# Patient Record
Sex: FEMALE | Race: WHITE | NOT HISPANIC OR LATINO
[De-identification: names, ages, dates, MRNs, and addresses within clinical notes are randomized per-mention and may not be internally consistent; named-entity substitution may affect disease eponyms.]

---

## 2020-12-18 PROBLEM — Z00.00 ENCOUNTER FOR PREVENTIVE HEALTH EXAMINATION: Status: ACTIVE | Noted: 2020-12-18

## 2021-01-06 ENCOUNTER — APPOINTMENT (OUTPATIENT)
Dept: NEUROLOGY | Facility: CLINIC | Age: 51
End: 2021-01-06
Payer: COMMERCIAL

## 2021-01-06 VITALS
WEIGHT: 135 LBS | TEMPERATURE: 97.2 F | DIASTOLIC BLOOD PRESSURE: 86 MMHG | HEIGHT: 64 IN | BODY MASS INDEX: 23.05 KG/M2 | HEART RATE: 99 BPM | SYSTOLIC BLOOD PRESSURE: 127 MMHG

## 2021-01-06 DIAGNOSIS — G11.9 HEREDITARY ATAXIA, UNSPECIFIED: ICD-10-CM

## 2021-01-06 DIAGNOSIS — R27.0 ATAXIA, UNSPECIFIED: ICD-10-CM

## 2021-01-06 DIAGNOSIS — R26.81 UNSTEADINESS ON FEET: ICD-10-CM

## 2021-01-06 DIAGNOSIS — E51.9 THIAMINE DEFICIENCY, UNSPECIFIED: ICD-10-CM

## 2021-01-06 DIAGNOSIS — G62.9 POLYNEUROPATHY, UNSPECIFIED: ICD-10-CM

## 2021-01-06 PROCEDURE — 99072 ADDL SUPL MATRL&STAF TM PHE: CPT

## 2021-01-06 PROCEDURE — 99204 OFFICE O/P NEW MOD 45 MIN: CPT

## 2021-01-07 RX ORDER — THIAMINE HCL 100 MG
100 TABLET ORAL
Refills: 0 | Status: ACTIVE | COMMUNITY

## 2021-01-08 NOTE — HISTORY OF PRESENT ILLNESS
[FreeTextEntry1] : Selina Colbert is a 50 year old woman with a history of hypertension, and heavy alcohol use presenting for a consultation for numbness of the feet and hands and worsening gait and balance.\par \par She has had numbness in her hands since 5/2018 and a slow, gradual numbness in her feet for over one year. She had one fall two years ago where she fell backwards down the stairs. \par \par The day prior to Thanksgiving she had hand numbness with leg stiffness and unsteady gait. She went to urgent care and then [facility] and had a CT done.  She also had elevated blood pressure while in the ED. Her gait has slightly improved over 6 weeks. Ms. Colbert also has a history of heavy alcohol use and poor nutrition. She quit 2.5 weeks ago. Ms. Colbert endorses that she found to have a thiamine deficiency and received IV thiamine while in the hospital and is still taking thiamine supplements. She also had shaking of her hands after stopping alcohol and was placed in the observation unit at the hospital. She was diagnosed with possible alcohol withdrawal. Ms. Colbert denies bowel or bladder difficulties. \par \par The remaining neurological review of systems is negative.

## 2021-01-08 NOTE — CONSULT LETTER
[Dear  ___] : Dear  [unfilled], [FreeTextEntry1] : I had the pleasure of evaluating your patient, LAWRENCE BAH. Please see the assessment section below for a summary of my diagnostic impression and plan.\par \par Thank you very much for allowing me to participate in the care of this patient. If you have any questions, please do not hesitate to contact me. \par \par Sincerely,\par \par Jodi Justin MD\par

## 2021-01-08 NOTE — PHYSICAL EXAM
[FreeTextEntry1] : Physical examination \par General: No acute distress, Awake, Alert.   \par Other: axial ataxia. \par \par Mental status \par Awake, alert, and oriented to person, time and place, Normal attention span and concentration, Recent and remote memory intact, Language intact, Fund of knowledge intact.   \par 4Q1$, 7Q$1.75\par Delayed recall 3/3. \par \par Cranial Nerves \par II: VFF  \par III, IV, VI: PERRL, EOMI.   \par V: Facial sensation is normal B/L.   \par VII: Facial strength is normal B/L. \par \par \par VIII: Gross hearing is intact.   \par \par \par IX, X: Palate is midline and elevates symmetrically.   \par XI: Trapezius normal strength.   \par XII: Tongue midline without atrophy or fasciculations. \par \par Motor exam  \par Muscle tone - mild cogwheel in arms. spasticity in legs with foot inversion. \par No atrophy or fasciculations \par Muscle Strength: arms and legs, proximal and distal flexors and extensors are normal \par \par No UE drift.\par \par Hand tremors B. high frequency, low amplitude with action and posture irregular. \par Reflexes \par BR with FF B.\par  Bicep, tricep 1.\par Pectoralis major present. \par knees 1\par Ankles 0\par \par Plantars right: downgoing.\par Plantars left: downgoing. \par \par \par Coordination \par Slow, ataxia - FNF, GENIE, HKS. Left fist slower with open and closing and clumsiness of hands.\par \par Sensory \par Intact sensation to PP and cold.\par Proprioception decreased slightly to lower extremities.\par Reduced distal sensation to vibration B. \par \par Positive Romberg. \par \par Gait \par steppage gait ?spastic gait\par \par

## 2021-01-15 ENCOUNTER — LABORATORY RESULT (OUTPATIENT)
Age: 51
End: 2021-01-15

## 2021-01-19 LAB
ACE BLD-CCNC: 37 U/L
ALBUMIN MFR SERPL ELPH: 58.6 %
ALBUMIN SERPL-MCNC: 3.8 G/DL
ALBUMIN/GLOB SERPL: 1.4 RATIO
ALPHA1 GLOB MFR SERPL ELPH: 5.5 %
ALPHA1 GLOB SERPL ELPH-MCNC: 0.4 G/DL
ALPHA2 GLOB MFR SERPL ELPH: 12.3 %
ALPHA2 GLOB SERPL ELPH-MCNC: 0.8 G/DL
B BURGDOR IGG+IGM SER QL IB: NORMAL
B-GLOBULIN MFR SERPL ELPH: 10.1 %
B-GLOBULIN SERPL ELPH-MCNC: 0.7 G/DL
CRP SERPL-MCNC: 0.45 MG/DL
DSDNA AB SER-ACNC: <12 IU/ML
ENA RNP AB SER IA-ACNC: 0.2 AL
ENA SM AB SER IA-ACNC: <0.2 AL
ENA SS-A AB SER IA-ACNC: <0.2 AL
ENA SS-B AB SER IA-ACNC: <0.2 AL
ERYTHROCYTE [SEDIMENTATION RATE] IN BLOOD BY WESTERGREN METHOD: 18 MM/HR
ESTIMATED AVERAGE GLUCOSE: 134 MG/DL
GAMMA GLOB FLD ELPH-MCNC: 0.9 G/DL
GAMMA GLOB MFR SERPL ELPH: 13.5 %
GLIADIN IGA SER QL: <5 UNITS
GLIADIN IGG SER QL: <5 UNITS
GLIADIN PEPTIDE IGA SER-ACNC: NEGATIVE
GLIADIN PEPTIDE IGG SER-ACNC: NEGATIVE
HBA1C MFR BLD HPLC: 6.3 %
HTLV I+II AB SER QL: NORMAL
INTERPRETATION SERPL IEP-IMP: NORMAL
M PROTEIN SPEC IFE-MCNC: NORMAL
MPO AB + PR3 PNL SER: NORMAL
PROT SERPL-MCNC: 6.5 G/DL
PROT SERPL-MCNC: 6.5 G/DL
RHEUMATOID FACT SER QL: 11 IU/ML
TSH SERPL-ACNC: 4.47 UIU/ML

## 2021-01-20 ENCOUNTER — RESULT REVIEW (OUTPATIENT)
Age: 51
End: 2021-01-20

## 2021-01-20 LAB
ANA SER IF-ACNC: NEGATIVE
COPPER SERPL-MCNC: 107 UG/DL
VIT B12 SERPL-MCNC: 1690 PG/ML

## 2021-01-21 LAB
TTG IGA SER IA-ACNC: <1.2 U/ML
TTG IGA SER-ACNC: NEGATIVE
TTG IGG SER IA-ACNC: 1.7 U/ML
TTG IGG SER IA-ACNC: NEGATIVE
ZINC SERPL-MCNC: 71 UG/DL

## 2021-01-22 ENCOUNTER — NON-APPOINTMENT (OUTPATIENT)
Age: 51
End: 2021-01-22

## 2021-01-25 LAB — VIT B1 SERPL-MCNC: 253.7 NMOL/L

## 2021-02-03 ENCOUNTER — APPOINTMENT (OUTPATIENT)
Dept: NEUROLOGY | Facility: CLINIC | Age: 51
End: 2021-02-03
Payer: COMMERCIAL

## 2021-02-03 PROCEDURE — 95886 MUSC TEST DONE W/N TEST COMP: CPT

## 2021-02-03 PROCEDURE — 95913 NRV CNDJ TEST 13/> STUDIES: CPT

## 2021-02-03 PROCEDURE — 99072 ADDL SUPL MATRL&STAF TM PHE: CPT

## 2021-02-08 ENCOUNTER — APPOINTMENT (OUTPATIENT)
Dept: NEUROLOGY | Facility: CLINIC | Age: 51
End: 2021-02-08
Payer: COMMERCIAL

## 2021-02-08 VITALS
TEMPERATURE: 97.2 F | WEIGHT: 135 LBS | HEIGHT: 64 IN | SYSTOLIC BLOOD PRESSURE: 129 MMHG | BODY MASS INDEX: 23.05 KG/M2 | DIASTOLIC BLOOD PRESSURE: 87 MMHG | HEART RATE: 104 BPM

## 2021-02-08 PROCEDURE — 99072 ADDL SUPL MATRL&STAF TM PHE: CPT

## 2021-02-08 PROCEDURE — 99215 OFFICE O/P EST HI 40 MIN: CPT

## 2021-02-09 NOTE — PHYSICAL EXAM
[FreeTextEntry1] : Physical examination \par General: No acute distress, Awake, Alert.   \par Other: axial ataxia. (improved from previous visit)\par \par Mental status \par Awake, alert, and oriented to person, time and place, Normal attention span and concentration, Recent and remote memory intact, Language intact, Fund of knowledge intact.   \par 4Q1$, 7Q$1.75\par Delayed recall 3/3. \par \par Cranial Nerves \par II: VFF  \par III, IV, VI: PERRL, EOMI.   \par V: Facial sensation is normal B/L.   \par VII: Facial strength is normal B/L. \par \par VIII: Gross hearing is intact.   \par \par IX, X: Palate is midline and elevates symmetrically.   \par XI: Trapezius normal strength.   \par XII: Tongue midline without atrophy or fasciculations. \par \par Motor exam  \par Muscle tone - mild cogwheel in arms. minimal or no spasticity in legs with no foot inversion. \par No atrophy or fasciculations \par Muscle Strength: arms and legs, proximal and distal flexors and extensors are normal \par \par No UE drift.\par \par Minimal hand tremors B. high frequency, low amplitude with action and posture irregular. \par \par Reflexes \par Ankles 0\par Arms 2\par legs 0\par ankle 0\par Plantars right: downgoing.\par Plantars left: downgoing. \par \par \par Coordination \par Slow, ataxia - FNF, GENIE, HKS. Left fist slower with open and closing and clumsiness of hands.\par \par Sensory \par Intact sensation to cold. \par Increased PP above ankle B.\par Decreased vibration Left MM.\par No vibration at right MM. \par Positive Romberg. \par \par Gait \par wide based, tentative.  no steppage gait, no spastic gait (improved from previous visit) \par \par

## 2021-02-09 NOTE — DATA REVIEWED
[de-identified] : 1/20/21 No acute intracranial hemorrhage, acute infarction, extra-axial fluid collection or hydrocephalus. 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  [de-identified] : 2/3/21 EMG - There is electrophysiologic evidence of sensory polyneuropathy. \par 1/20/21 MRI cervical spine- Mild cervical spondylitic changes as detailed above most notable for mild left foraminal stenosis at C5-C6 and mild right foraminal stenosis at C6-C7. \par 12/2020 CT head-see report

## 2021-02-09 NOTE — ASSESSMENT
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 Serological workup for other identifiable causes previously reviewed.  Second opinion with Dr. Erik Gonzalez - consider any other diagnostic etiologies including autoimmune disease - do you recommend any other work up or treatment.  \par \par MRI brain consistent with a diagnosis of thiamine deficiency.\par \par No evidence of a compressive myelopathy on MRI cervical spine.\par EMG and MRI results reviewed with patient and family. \par Continue thiamine\par \par PT referral. \par \par Follow up in 1 month.\par \par I discussed in detail with the patient and family the diagnosis, prognosis, treatment plan and answered all of their questions.\par \par

## 2021-02-09 NOTE — HISTORY OF PRESENT ILLNESS
[FreeTextEntry1] : Selina Colbert is a 50 year old woman with a history of hypertension, and heavy alcohol use presenting for a follow up appointment.\par \par Her initial presentation was in November 2020, she had a rapid decline in her walking with gait imbalance and since then has had a slow improvement but still with significant impairment.  She endorses her gait has improved. She cannot stand for a long time. There is some fluctuation in her walking.\par \par She describes her hands as feeling like sandpaper and numbness in her feet.\par \par The remaining neurological review of systems is negative. \par  \par 1/6/21\par Selina Colbert is a 50 year old woman with a history of hypertension, and heavy alcohol use presenting for a consultation for numbness of the feet and hands and worsening gait and balance.\par \par She has had numbness in her hands since 5/2018 and a slow, gradual numbness in her feet for over one year. She had one fall two years ago where she fell backwards down the stairs. \par \par The day prior to Thanksgiving she had hand numbness with leg stiffness and unsteady gait. She went to urgent care and then [facility] and had a CT done.  She also had elevated blood pressure while in the ED. Her gait has slightly improved over 6 weeks. Ms. Colbert also has a history of heavy alcohol use and poor nutrition. She quit 2.5 weeks ago. Ms. Colbert endorses that she found to have a thiamine deficiency and received IV thiamine while in the hospital and is still taking thiamine supplements. She also had shaking of her hands after stopping alcohol and was placed in the observation unit at the hospital. She was diagnosed with possible alcohol withdrawal. Ms. Colbert denies bowel or bladder difficulties. \par \par The remaining neurological review of systems is negative.

## 2021-03-11 ENCOUNTER — APPOINTMENT (OUTPATIENT)
Dept: NEUROLOGY | Facility: CLINIC | Age: 51
End: 2021-03-11
Payer: COMMERCIAL

## 2021-03-11 VITALS
BODY MASS INDEX: 23.56 KG/M2 | HEIGHT: 64 IN | WEIGHT: 138 LBS | HEART RATE: 94 BPM | SYSTOLIC BLOOD PRESSURE: 124 MMHG | DIASTOLIC BLOOD PRESSURE: 86 MMHG

## 2021-03-11 VITALS — TEMPERATURE: 97.3 F

## 2021-03-11 LAB
FOLATE SERPL-MCNC: >20 NG/ML
HCYS SERPL-MCNC: 8.4 UMOL/L
VIT B12 SERPL-MCNC: 954 PG/ML

## 2021-03-11 PROCEDURE — 99072 ADDL SUPL MATRL&STAF TM PHE: CPT

## 2021-03-11 PROCEDURE — 99214 OFFICE O/P EST MOD 30 MIN: CPT

## 2021-03-11 RX ORDER — AMLODIPINE BESYLATE 5 MG/1
5 TABLET ORAL
Refills: 0 | Status: DISCONTINUED | COMMUNITY
End: 2021-03-11

## 2021-03-11 NOTE — HISTORY OF PRESENT ILLNESS
[FreeTextEntry1] : Patient referred for evaluation of neuropathy.  She states that about a year ago she first noted tingling soles of feet and she was walking normally.  She also noted frequent waking at night with tingling arms.  She was working as a pharmacy tech.  Over the next several months she had some difficulty with balance.  \par \par This past November she noted the hands were tingling more and the feet had same tingling but she had more trouble walking - [facility].  Evaluated in ED and was given fluids.  She was diagnosed with thiamine deficiency in November.  By Jan she needed a walker due to weakness in the legs.  \par \par Today her legs and arms feel heavy, the tingling persists and now has some burning on soles and tops of toes.  The hands continue to have tingling but no burning and she has weak  and feels heaviness of the arms.  She has constant daily HA.  \par \par She endorses daily drinking, vodka, 3-5 drinks daily for 30 years.  Her last drink was December.  Memory is ok, no change in urinary habits. \par \par She denies diplopia, ptosis, dysphagia or SOB.  EMG by Dr. Justin shows sensory polyneuropathy.  MRI C spine no compression of cord\par \par She denies significant FH of genetic neuropathy.

## 2021-03-11 NOTE — ASSESSMENT
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  \par \par Will check Vit B12, MMA, homocysteine, folate and will call with results.  \par \par I have advised her that if she continues to abstain from ETOH use she will improve over time.  I have also encouraged her to go to 12 step meetings to help her stay sober.  \par \par

## 2021-03-11 NOTE — PHYSICAL EXAM
[Person] : oriented to person [Place] : oriented to place [Time] : oriented to time [Concentration Intact] : normal concentrating ability [Visual Intact] : visual attention was ~T not ~L decreased [Naming Objects] : no difficulty naming common objects [Repeating Phrases] : no difficulty repeating a phrase [Writing A Sentence] : no difficulty writing a sentence [Fluency] : fluency intact [Comprehension] : comprehension intact [Reading] : reading intact [Past History] : adequate knowledge of personal past history [Cranial Nerves Optic (II)] : visual acuity intact bilaterally,  visual fields full to confrontation, pupils equal round and reactive to light [Cranial Nerves Oculomotor (III)] : extraocular motion intact [Cranial Nerves Trigeminal (V)] : facial sensation intact symmetrically [Cranial Nerves Facial (VII)] : face symmetrical [Cranial Nerves Vestibulocochlear (VIII)] : hearing was intact bilaterally [Cranial Nerves Glossopharyngeal (IX)] : tongue and palate midline [Cranial Nerves Accessory (XI - Cranial And Spinal)] : head turning and shoulder shrug symmetric [Cranial Nerves Hypoglossal (XII)] : there was no tongue deviation with protrusion [Motor Tone] : muscle tone was normal in all four extremities [No Muscle Atrophy] : normal bulk in all four extremities [Motor Handedness Right-Handed] : the patient is right hand dominant [Hand Weakness Right] : normal hand  [Hand Weakness Left] : normal hand  [+4] : knee flexion +4/5 [5] : ankle plantar flexion 5/5 [Sensation Tactile Decrease] : light touch was intact [Vibration Decrease Leg / Foot Toes Both Feet] : decreased at the toes of both feet  [Position Sensation Decrease Leg/Foot At Level Of Toes] : impaired at the toes in both legs [Past-pointing] : there was no past-pointing [Tremor] : no tremor present [0] : Patella left 0 [1+] : Ankle jerk left 1+ [Plantar Reflex Right Only] : abnormal on the right [Plantar Reflex Left Only] : normal on the left [___] : absent on the right [___] : absent on the left [FreeTextEntry8] : wide based ataxic gait

## 2021-03-17 LAB — METHYLMALONATE SERPL-SCNC: 114 NMOL/L

## 2021-03-19 ENCOUNTER — NON-APPOINTMENT (OUTPATIENT)
Age: 51
End: 2021-03-19

## 2021-04-02 ENCOUNTER — APPOINTMENT (OUTPATIENT)
Dept: NEUROLOGY | Facility: CLINIC | Age: 51
End: 2021-04-02
Payer: COMMERCIAL

## 2021-04-02 VITALS
HEART RATE: 81 BPM | TEMPERATURE: 97.4 F | BODY MASS INDEX: 23.56 KG/M2 | SYSTOLIC BLOOD PRESSURE: 113 MMHG | HEIGHT: 64 IN | DIASTOLIC BLOOD PRESSURE: 78 MMHG | WEIGHT: 138 LBS

## 2021-04-02 DIAGNOSIS — G62.1 ALCOHOLIC POLYNEUROPATHY: ICD-10-CM

## 2021-04-02 PROCEDURE — 99214 OFFICE O/P EST MOD 30 MIN: CPT

## 2021-04-02 PROCEDURE — 99072 ADDL SUPL MATRL&STAF TM PHE: CPT

## 2021-04-06 NOTE — ASSESSMENT
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 She continues to improve. \par \par Second opinion with Dr. Erik Gonzalez - appreciated. \par \par MRI brain consistent with a diagnosis of thiamine deficiency.\par \par No evidence of a compressive myelopathy on MRI cervical spine.\par EMG and MRI results reviewed with patient and family. \par Continue thiamine\par \par Neuropathic pain- improved. Continue Cymbalta 60mg daily. \par \par Continue PT.\par \par Follow up in 3 months. \par \par I discussed in detail with the patient and family the diagnosis, prognosis, treatment plan and answered all of their questions.\par \par

## 2021-04-06 NOTE — PHYSICAL EXAM
[FreeTextEntry1] : Physical examination \par General: No acute distress, Awake, Alert.   \par Other: axial ataxia. (improved from previous visit)\par \par Mental status \par Awake, alert, gives detailed history. \par \par Cranial Nerves \par II: VFF  \par III, IV, VI: PERRL, EOMI.   \par V: Facial sensation is normal B/L.   \par VII: Facial strength is normal B/L. \par \par VIII: Gross hearing is intact.   \par \par IX, X: Palate is midline and elevates symmetrically.   \par XI: Trapezius normal strength.   \par XII: Tongue midline without atrophy or fasciculations. \par \par Motor exam  \par Muscle tone - mild cogwheel in arms. minimal or no spasticity in legs with no foot inversion. \par No atrophy or fasciculations \par Muscle Strength: arms and legs, proximal and distal flexors and extensors are normal \par \par No UE drift.\par \par Minimal hand tremors B. high frequency, low amplitude with action and posture irregular. \par \par Reflexes \par Ankles 0\par Arms 1\par legs 1\par \par Plantars right: downgoing.\par Plantars left: downgoing. \par \par \par Coordination \par Slow, ataxia - FNF, GENIE, HKS. Left fist slower with open and closing and clumsiness of hands.\par \par Sensory \par Intact sensation to cold. \par Increased PP above left ankle with dysesthesias. \par Right ankle PP intact\par Decreased vibration great toe B. (improved from previous exam)\par Positive Romberg. \par \par Gait \par wide based.  no steppage gait, no spastic gait (improved from previous visit) \par \par

## 2021-04-06 NOTE — HISTORY OF PRESENT ILLNESS
[FreeTextEntry1] : Selina Colbert is a 50 year old woman with a history of hypertension, heavy alcohol use presenting for a follow up appointment. \par \par She is currently taking Cymbalta 60mg with relief of neuropathic pain - she currently does not have any pain. The pain went away after one day on Cymbalta 30mg. She continues to have mild numbness in her hands and feet and reports her legs feel "lighter." Her left thigh is sore from physical therapy. Ms. Colbert has been getting physical therapy for the past four weeks. \par \par She endorses that she is walking better and has not had any falls or injuries. \par \par The remaining neurological review of systems is negative.\par \par 2/8/21\par Selina Colbert is a 50 year old woman with a history of hypertension, and heavy alcohol use presenting for a follow up appointment.\par \par Her initial presentation was in November 2020, she had a rapid decline in her walking with gait imbalance and since then has had a slow improvement but still with significant impairment.  She endorses her gait has improved. She cannot stand for a long time. There is some fluctuation in her walking.\par \par She describes her hands as feeling like sandpaper and numbness in her feet.\par \par The remaining neurological review of systems is negative. \par  \par 1/6/21\par Selina Colbert is a 50 year old woman with a history of hypertension, and heavy alcohol use presenting for a consultation for numbness of the feet and hands and worsening gait and balance.\par \par She has had numbness in her hands since 5/2018 and a slow, gradual numbness in her feet for over one year. She had one fall two years ago where she fell backwards down the stairs. \par \par The day prior to Thanksgiving she had hand numbness with leg stiffness and unsteady gait. She went to urgent care and then [facility] and had a CT done.  She also had elevated blood pressure while in the ED. Her gait has slightly improved over 6 weeks. Ms. Colbert also has a history of heavy alcohol use and poor nutrition. She quit 2.5 weeks ago. Ms. Colbert endorses that she found to have a thiamine deficiency and received IV thiamine while in the hospital and is still taking thiamine supplements. She also had shaking of her hands after stopping alcohol and was placed in the observation unit at the hospital. She was diagnosed with possible alcohol withdrawal. Ms. Colbert denies bowel or bladder difficulties. \par \par The remaining neurological review of systems is negative.

## 2021-07-02 ENCOUNTER — APPOINTMENT (OUTPATIENT)
Dept: NEUROLOGY | Facility: CLINIC | Age: 51
End: 2021-07-02
Payer: MEDICAID

## 2021-07-02 VITALS
SYSTOLIC BLOOD PRESSURE: 153 MMHG | BODY MASS INDEX: 22.2 KG/M2 | DIASTOLIC BLOOD PRESSURE: 89 MMHG | HEIGHT: 64 IN | HEART RATE: 67 BPM | TEMPERATURE: 97.8 F | WEIGHT: 130 LBS

## 2021-07-02 DIAGNOSIS — G60.8 OTHER HEREDITARY AND IDIOPATHIC NEUROPATHIES: ICD-10-CM

## 2021-07-02 PROCEDURE — 99072 ADDL SUPL MATRL&STAF TM PHE: CPT

## 2021-07-02 PROCEDURE — 99213 OFFICE O/P EST LOW 20 MIN: CPT

## 2021-07-07 NOTE — CONSULT LETTER
[Dear  ___] : Dear  [unfilled], [FreeTextEntry1] : I had the pleasure of evaluating your patient, LAWRENCE BHA. Please see the assessment section below for a summary of my diagnostic impression and plan.\par \par Thank you very much for allowing me to participate in the care of this patient. If you have any questions, please do not hesitate to contact me. \par \par Sincerely,\par \par Jodi Justin MD\par Ann Marie Jean N.P.\par

## 2021-07-07 NOTE — ASSESSMENT
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 She continues to improve. \par \par Second opinion with Dr. Erik Gonzalez appreciated. \par \par MRI brain consistent with a diagnosis of thiamine deficiency.\par \par No evidence of a compressive myelopathy on MRI cervical spine.\par EMG and MRI results reviewed with patient and family. \par Continue thiamine\par \par Neuropathic pain- improved. Continue Cymbalta 60mg daily. We discussed increasing to Cymbalta 90mg. She would like to continue the current dose. \par \par Continue home PT exercises.\par \par Follow up in 3 months. \par \par I discussed in detail with the patient and family the diagnosis, prognosis, treatment plan and answered all of their questions.\par \par

## 2021-07-07 NOTE — HISTORY OF PRESENT ILLNESS
[FreeTextEntry1] : Selina Colbert is a 50 year old woman with a history of hypertension, heavy alcohol use presenting for a follow up appointment. \par \par She is currently taking Cymbalta 60mg with relief of neuropathic pain. She has occasional discomfort to the lower extremities and hands described as "sandpaper" and annoying feeling. She tried Cymbalta 90mg for a few days and had increased relief. She would like to remain on Cymbalta 60mg. Overall, her balance and gait has improved. She is no longer in home physical therapy but continues her exercises. She would like to trial going back to work on a part time basis. 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 \par \par She is currently taking Cymbalta 60mg with relief of neuropathic pain - she currently does not have any pain. The pain went away after one day on Cymbalta 30mg. She continues to have mild numbness in her hands and feet and reports her legs feel "lighter." Her left thigh is sore from physical therapy. Ms. Colbert has been getting physical therapy for the past four weeks. \par \par She endorses that she is walking better and has not had any falls or injuries. \par \par The remaining neurological review of systems is negative.\par \par 2/8/21\par Selina Colbert is a 50 year old woman with a history of hypertension, and heavy alcohol use presenting for a follow up appointment.\par \par Her initial presentation was in November 2020, she had a rapid decline in her walking with gait imbalance and since then has had a slow improvement but still with significant impairment.  She endorses her gait has improved. She cannot stand for a long time. There is some fluctuation in her walking.\par \par She describes her hands as feeling like sandpaper and numbness in her feet.\par \par The remaining neurological review of systems is negative. \par  \par 1/6/21\par Selina Colbert is a 50 year old woman with a history of hypertension, and heavy alcohol use presenting for a consultation for numbness of the feet and hands and worsening gait and balance.\par \par She has had numbness in her hands since 5/2018 and a slow, gradual numbness in her feet for over one year. She had one fall two years ago where she fell backwards down the stairs. \par \par The day prior to Thanksgiving she had hand numbness with leg stiffness and unsteady gait. She went to urgent care and then [facility] and had a CT done.  She also had elevated blood pressure while in the ED. Her gait has slightly improved over 6 weeks. Ms. Colbert also has a history of heavy alcohol use and poor nutrition. She quit 2.5 weeks ago. Ms. Colbert endorses that she found to have a thiamine deficiency and received IV thiamine while in the hospital and is still taking thiamine supplements. She also had shaking of her hands after stopping alcohol and was placed in the observation unit at the hospital. She was diagnosed with possible alcohol withdrawal. Ms. Colbert denies bowel or bladder difficulties. \par \par The remaining neurological review of systems is negative.

## 2021-07-07 NOTE — DATA REVIEWED
[de-identified] : 1/20/21 No acute intracranial hemorrhage, acute infarction, extra-axial fluid collection or hydrocephalus. 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  [de-identified] : 2/3/21 EMG - There is electrophysiologic evidence of sensory polyneuropathy. \par 1/20/21 MRI cervical spine- Mild cervical spondylitic changes as detailed above most notable for mild left foraminal stenosis at C5-C6 and mild right foraminal stenosis at C6-C7. \par 12/2020 CT head-see report

## 2021-07-07 NOTE — PHYSICAL EXAM
[FreeTextEntry1] : Physical examination \par General: No acute distress, Awake, Alert.   \par Other: axial ataxia. (improved from previous visit)\par \par Mental status \par Awake, alert, gives detailed history. \par \par Cranial Nerves \par II: VFF  \par III, IV, VI: PERRL, EOMI.   \par V: Facial sensation is normal B/L.   \par VII: Facial strength is normal B/L. \par \par VIII: Gross hearing is intact.   \par \par IX, X: Palate is midline and elevates symmetrically.   \par XI: Trapezius normal strength.   \par XII: Tongue midline without atrophy or fasciculations. \par \par Motor exam  \par Muscle tone - mild cogwheel in arms. minimal or no spasticity in legs with no foot inversion. \par No atrophy or fasciculations \par Muscle Strength: arms and legs, proximal and distal flexors and extensors are normal \par \par No UE drift.\par \par Minimal hand tremors B. high frequency, low amplitude with action and posture irregular. \par \par Reflexes \par Ankles 0\par BR 0 B\par Arms 1\par legs 1\par \par Plantars right: downgoing.\par Plantars left: downgoing. \par \par \par Coordination \par Slow, ataxia - FNF, GENIE, HKS. Left fist slower with open and closing and clumsiness of hands.\par \par Sensory \par Intact sensation to cold. \par Increased PP above left ankle with dysesthesias. \par Right ankle PP intact\par Decreased vibration great toe B. (improved from previous exam)\par Positive Romberg. \par \par Gait \par wide based.  no steppage gait, no spastic gait (improved from previous visit) \par \par

## 2021-10-04 ENCOUNTER — APPOINTMENT (OUTPATIENT)
Dept: NEUROLOGY | Facility: CLINIC | Age: 51
End: 2021-10-04
Payer: MEDICAID

## 2021-10-04 VITALS
TEMPERATURE: 97.2 F | WEIGHT: 160 LBS | HEIGHT: 64 IN | SYSTOLIC BLOOD PRESSURE: 158 MMHG | DIASTOLIC BLOOD PRESSURE: 98 MMHG | HEART RATE: 79 BPM | BODY MASS INDEX: 27.31 KG/M2

## 2021-10-04 DIAGNOSIS — G62.9 POLYNEUROPATHY, UNSPECIFIED: ICD-10-CM

## 2021-10-04 DIAGNOSIS — G95.9 DISEASE OF SPINAL CORD, UNSPECIFIED: ICD-10-CM

## 2021-10-04 PROCEDURE — 99213 OFFICE O/P EST LOW 20 MIN: CPT

## 2021-10-04 RX ORDER — DULOXETINE HYDROCHLORIDE 30 MG/1
30 CAPSULE, DELAYED RELEASE PELLETS ORAL AT BEDTIME
Qty: 30 | Refills: 5 | Status: ACTIVE | COMMUNITY
Start: 2021-10-04 | End: 1900-01-01

## 2021-10-04 NOTE — DATA REVIEWED
[de-identified] : 1/20/21 No acute intracranial hemorrhage, acute infarction, extra-axial fluid collection or hydrocephalus. 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  [de-identified] : 2/3/21 EMG - There is electrophysiologic evidence of sensory polyneuropathy. \par 1/20/21 MRI cervical spine- Mild cervical spondylitic changes as detailed above most notable for mild left foraminal stenosis at C5-C6 and mild right foraminal stenosis at C6-C7. \par 12/2020 CT head-see report

## 2021-10-04 NOTE — PHYSICAL EXAM
[FreeTextEntry1] : Physical examination \par General: No acute distress, Awake, Alert.   \par Other: no axial ataxia when sitting (improved from previous visit)\par \par Mental status \par Awake, alert, gives detailed history. \par \par Cranial Nerves \par II: VFF  \par III, IV, VI: PERRL, EOMI.   \par V: Facial sensation is normal B/L.   \par VII: Facial strength is normal B/L. \par \par VIII: Gross hearing is intact.   \par \par IX, X: Palate is midline and elevates symmetrically.   \par XI: Trapezius normal strength.   \par XII: Tongue midline without atrophy or fasciculations. \par \par Motor exam  \par Muscle tone - mild cogwheel in arms. minimal or no spasticity in legs with no foot inversion. \par No atrophy or fasciculations \par Muscle Strength: arms and legs, proximal and distal flexors and extensors are normal \par \par No UE drift.\par \par Minimal hand tremors B. high frequency, low amplitude with action and posture irregular. \par \par Reflexes \par Ankles 0\par BR 0 B\par Arms 1\par legs 1\par \par Plantars right: downgoing.\par Plantars left: downgoing. \par \par \par Coordination \par Slow, ataxia - FNF, GENIE, HKS. Left fist slower with open and closing and clumsiness of hands.\par \par Sensory \par Intact sensation to cold. \par Decreased PP to penitentiary up the lower leg. No dysesthesias. \par Decreased vibration great toe B.\par Mild positive Romberg. \par \par Gait \par wide based.  no steppage gait, no spastic gait\par \par

## 2021-10-04 NOTE — HISTORY OF PRESENT ILLNESS
[FreeTextEntry1] : She has been working 2 days/week 3 hours/day since July.  It is difficult because she has to stand all day.  When she stands she has an increase in her low back pain and burning pain in the toes.  She recently started working 4 hours/day and it is painful and she has difficulty functioning the next day.  Cymbalta 60 mg with dinner helps her pain.  She sometimes still has pain prior to going to sleep and if she takes 30 mg of Cymbalta at bedtime this helps significantly.  No other new complaints.  No other changes.  She still has some gait imbalance but overall improved since January.\par \par Note from July 2, 2021\cami Colbert is a 50 year old woman with a history of hypertension, heavy alcohol use presenting for a follow up appointment. \par \par She is currently taking Cymbalta 60mg with relief of neuropathic pain. She has occasional discomfort to the lower extremities and hands described as "sandpaper" and annoying feeling. She tried Cymbalta 90mg for a few days and had increased relief. She would like to remain on Cymbalta 60mg. Overall, her balance and gait has improved. She is no longer in home physical therapy but continues her exercises. She would like to trial going back to work on a part time basis. 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 \par \par She is currently taking Cymbalta 60mg with relief of neuropathic pain - she currently does not have any pain. The pain went away after one day on Cymbalta 30mg. She continues to have mild numbness in her hands and feet and reports her legs feel "lighter." Her left thigh is sore from physical therapy. Ms. Colbert has been getting physical therapy for the past four weeks. \par \par She endorses that she is walking better and has not had any falls or injuries. \par \par The remaining neurological review of systems is negative.\par \par 2/8/21\par Selina Colbert is a 50 year old woman with a history of hypertension, and heavy alcohol use presenting for a follow up appointment.\par \par Her initial presentation was in November 2020, she had a rapid decline in her walking with gait imbalance and since then has had a slow improvement but still with significant impairment.  She endorses her gait has improved. She cannot stand for a long time. There is some fluctuation in her walking.\par \par She describes her hands as feeling like sandpaper and numbness in her feet.\par \par The remaining neurological review of systems is negative. \par  \par 1/6/21\par Selina Colbert is a 50 year old woman with a history of hypertension, and heavy alcohol use presenting for a consultation for numbness of the feet and hands and worsening gait and balance.\par \par She has had numbness in her hands since 5/2018 and a slow, gradual numbness in her feet for over one year. She had one fall two years ago where she fell backwards down the stairs. \par \par The day prior to Thanksgiving she had hand numbness with leg stiffness and unsteady gait. She went to urgent care and then [facility] and had a CT done.  She also had elevated blood pressure while in the ED. Her gait has slightly improved over 6 weeks. Ms. Colbert also has a history of heavy alcohol use and poor nutrition. She quit 2.5 weeks ago. Ms. Colbert endorses that she found to have a thiamine deficiency and received IV thiamine while in the hospital and is still taking thiamine supplements. She also had shaking of her hands after stopping alcohol and was placed in the observation unit at the hospital. She was diagnosed with possible alcohol withdrawal. Ms. Colbert denies bowel or bladder difficulties. \par \par The remaining neurological review of systems is negative.

## 2021-10-04 NOTE — CONSULT LETTER
[Dear  ___] : Dear  [unfilled], [FreeTextEntry1] : I had the pleasure of evaluating your patient, LAWRENCE BAH. Please see the assessment section below for a summary of my diagnostic impression and plan.\par \par Thank you very much for allowing me to participate in the care of this patient. If you have any questions, please do not hesitate to contact me. \par \par Sincerely,\par \par Jodi Justin MD\par Ann Marie Jean N.P.\par

## 2021-10-04 NOTE — ASSESSMENT
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 She continues to improve. \par \par Second opinion with Dr. Erik Gonzalez appreciated. \par \par MRI brain consistent with a diagnosis of thiamine deficiency.\par \par No evidence of a compressive myelopathy on MRI cervical spine.\par EMG and MRI results reviewed with patient and family previously. \par Continue thiamine\par \par Neuropathic pain-well-controlled continue Cymbalta 60mg at dinnertime plus 30 mg nightly if she continues to have pain prior to bedtime.  \par \par Continue home PT exercises.\par \par Follow up in 6 months. \par \par I discussed in detail with the patient the diagnosis, prognosis, treatment plan and answered all of their questions.\par \par

## 2022-01-20 RX ORDER — DULOXETINE HYDROCHLORIDE 60 MG/1
60 CAPSULE, DELAYED RELEASE PELLETS ORAL
Qty: 30 | Refills: 2 | Status: ACTIVE | COMMUNITY
Start: 2021-03-18 | End: 1900-01-01

## 2022-04-07 ENCOUNTER — APPOINTMENT (OUTPATIENT)
Dept: NEUROLOGY | Facility: CLINIC | Age: 52
End: 2022-04-07

## 2025-05-29 NOTE — DATA REVIEWED
Dr. Landeros: See attending attestation. [de-identified] : 1/20/21 No acute intracranial hemorrhage, acute infarction, extra-axial fluid collection or hydrocephalus. 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  [de-identified] : 2/3/21 EMG - There is electrophysiologic evidence of sensory polyneuropathy. \par 1/20/21 MRI cervical spine- Mild cervical spondylitic changes as detailed above most notable for mild left foraminal stenosis at C5-C6 and mild right foraminal stenosis at C6-C7. \par 12/2020 CT head-see report